# Patient Record
Sex: MALE | Race: WHITE
[De-identification: names, ages, dates, MRNs, and addresses within clinical notes are randomized per-mention and may not be internally consistent; named-entity substitution may affect disease eponyms.]

---

## 2018-08-03 ENCOUNTER — HOSPITAL ENCOUNTER (OUTPATIENT)
Dept: HOSPITAL 84 - D.CATH | Age: 53
Discharge: HOME | End: 2018-08-03
Attending: INTERNAL MEDICINE
Payer: COMMERCIAL

## 2018-08-03 VITALS — WEIGHT: 230.48 LBS | BODY MASS INDEX: 34.14 KG/M2 | HEIGHT: 69 IN

## 2018-08-03 VITALS — DIASTOLIC BLOOD PRESSURE: 86 MMHG | SYSTOLIC BLOOD PRESSURE: 158 MMHG

## 2018-08-03 DIAGNOSIS — R07.89: Primary | ICD-10-CM

## 2018-08-03 LAB
ANION GAP SERPL CALC-SCNC: 10 MMOL/L (ref 8–16)
BASOPHILS NFR BLD AUTO: 0.3 % (ref 0–2)
BUN SERPL-MCNC: 27 MG/DL (ref 7–18)
CALCIUM SERPL-MCNC: 9 MG/DL (ref 8.5–10.1)
CHLORIDE SERPL-SCNC: 100 MMOL/L (ref 98–107)
CO2 SERPL-SCNC: 31.6 MMOL/L (ref 21–32)
CREAT SERPL-MCNC: 1.5 MG/DL (ref 0.6–1.3)
EOSINOPHIL NFR BLD: 8.8 % (ref 0–7)
ERYTHROCYTE [DISTWIDTH] IN BLOOD BY AUTOMATED COUNT: 13.4 % (ref 11.5–14.5)
GLUCOSE SERPL-MCNC: 275 MG/DL (ref 74–106)
HCT VFR BLD CALC: 35.1 % (ref 42–54)
HGB BLD-MCNC: 11.5 G/DL (ref 13.5–17.5)
IMM GRANULOCYTES NFR BLD: 0.3 % (ref 0–5)
LYMPHOCYTES NFR BLD AUTO: 30.6 % (ref 15–50)
MCH RBC QN AUTO: 27.7 PG (ref 26–34)
MCHC RBC AUTO-ENTMCNC: 32.8 G/DL (ref 31–37)
MCV RBC: 84.6 FL (ref 80–100)
MONOCYTES NFR BLD: 12.2 % (ref 2–11)
NEUTROPHILS NFR BLD AUTO: 47.8 % (ref 40–80)
OSMOLALITY SERPL CALC.SUM OF ELEC: 288 MOSM/KG (ref 275–300)
PLATELET # BLD: 167 10X3/UL (ref 130–400)
PMV BLD AUTO: 10.3 FL (ref 7.4–10.4)
POTASSIUM SERPL-SCNC: 4.6 MMOL/L (ref 3.5–5.1)
RBC # BLD AUTO: 4.15 10X6/UL (ref 4.2–6.1)
SODIUM SERPL-SCNC: 137 MMOL/L (ref 136–145)
WBC # BLD AUTO: 5.9 10X3/UL (ref 4.8–10.8)

## 2018-11-19 ENCOUNTER — HOSPITAL ENCOUNTER (OUTPATIENT)
Dept: HOSPITAL 84 - D.LABREF | Age: 53
Discharge: HOME | End: 2018-11-19
Attending: ORTHOPAEDIC SURGERY
Payer: COMMERCIAL

## 2018-11-19 VITALS — BODY MASS INDEX: 34 KG/M2

## 2018-11-19 DIAGNOSIS — L08.9: Primary | ICD-10-CM

## 2018-11-26 ENCOUNTER — HOSPITAL ENCOUNTER (OUTPATIENT)
Dept: HOSPITAL 84 - D.MS | Age: 53
Setting detail: OBSERVATION
LOS: 3 days | Discharge: HOME | End: 2018-11-29
Attending: ORTHOPAEDIC SURGERY | Admitting: ORTHOPAEDIC SURGERY
Payer: COMMERCIAL

## 2018-11-26 VITALS — SYSTOLIC BLOOD PRESSURE: 111 MMHG | DIASTOLIC BLOOD PRESSURE: 57 MMHG

## 2018-11-26 VITALS
WEIGHT: 230 LBS | BODY MASS INDEX: 34.07 KG/M2 | BODY MASS INDEX: 34.07 KG/M2 | WEIGHT: 230 LBS | BODY MASS INDEX: 34.07 KG/M2 | HEIGHT: 69 IN | BODY MASS INDEX: 34.07 KG/M2 | HEIGHT: 69 IN

## 2018-11-26 VITALS — SYSTOLIC BLOOD PRESSURE: 145 MMHG | DIASTOLIC BLOOD PRESSURE: 86 MMHG

## 2018-11-26 VITALS — DIASTOLIC BLOOD PRESSURE: 84 MMHG | SYSTOLIC BLOOD PRESSURE: 164 MMHG

## 2018-11-26 DIAGNOSIS — E11.628: Primary | ICD-10-CM

## 2018-11-26 DIAGNOSIS — L03.039: ICD-10-CM

## 2018-11-27 VITALS — DIASTOLIC BLOOD PRESSURE: 99 MMHG | SYSTOLIC BLOOD PRESSURE: 169 MMHG

## 2018-11-27 VITALS — SYSTOLIC BLOOD PRESSURE: 159 MMHG | DIASTOLIC BLOOD PRESSURE: 93 MMHG

## 2018-11-27 VITALS — DIASTOLIC BLOOD PRESSURE: 68 MMHG | SYSTOLIC BLOOD PRESSURE: 127 MMHG

## 2018-11-27 VITALS — DIASTOLIC BLOOD PRESSURE: 64 MMHG | SYSTOLIC BLOOD PRESSURE: 124 MMHG

## 2018-11-27 VITALS — SYSTOLIC BLOOD PRESSURE: 145 MMHG | DIASTOLIC BLOOD PRESSURE: 85 MMHG

## 2018-11-27 VITALS — SYSTOLIC BLOOD PRESSURE: 138 MMHG | DIASTOLIC BLOOD PRESSURE: 84 MMHG

## 2018-11-28 VITALS — SYSTOLIC BLOOD PRESSURE: 127 MMHG | DIASTOLIC BLOOD PRESSURE: 75 MMHG

## 2018-11-28 VITALS — DIASTOLIC BLOOD PRESSURE: 84 MMHG | SYSTOLIC BLOOD PRESSURE: 133 MMHG

## 2018-11-28 VITALS — SYSTOLIC BLOOD PRESSURE: 137 MMHG | DIASTOLIC BLOOD PRESSURE: 85 MMHG

## 2018-11-28 VITALS — DIASTOLIC BLOOD PRESSURE: 88 MMHG | SYSTOLIC BLOOD PRESSURE: 139 MMHG

## 2018-11-28 LAB
ANION GAP SERPL CALC-SCNC: 8.6 MMOL/L (ref 8–16)
BASOPHILS NFR BLD AUTO: 0.3 % (ref 0–2)
BUN SERPL-MCNC: 26 MG/DL (ref 7–18)
CALCIUM SERPL-MCNC: 9.4 MG/DL (ref 8.5–10.1)
CHLORIDE SERPL-SCNC: 99 MMOL/L (ref 98–107)
CO2 SERPL-SCNC: 31.6 MMOL/L (ref 21–32)
CREAT SERPL-MCNC: 1.1 MG/DL (ref 0.6–1.3)
CRP SERPL-MCNC: 0.7 MG/DL (ref 0–0.9)
EOSINOPHIL NFR BLD: 7 % (ref 0–7)
ERYTHROCYTE [DISTWIDTH] IN BLOOD BY AUTOMATED COUNT: 13.4 % (ref 11.5–14.5)
ERYTHROCYTE [SEDIMENTATION RATE] IN BLOOD: 18 MM/HR (ref 0–20)
GLUCOSE SERPL-MCNC: 223 MG/DL (ref 74–106)
HCT VFR BLD CALC: 35.8 % (ref 42–54)
HGB BLD-MCNC: 11.7 G/DL (ref 13.5–17.5)
IMM GRANULOCYTES NFR BLD: 0.1 % (ref 0–5)
LYMPHOCYTES NFR BLD AUTO: 25 % (ref 15–50)
MCH RBC QN AUTO: 27.5 PG (ref 26–34)
MCHC RBC AUTO-ENTMCNC: 32.7 G/DL (ref 31–37)
MCV RBC: 84 FL (ref 80–100)
MONOCYTES NFR BLD: 7.9 % (ref 2–11)
NEUTROPHILS NFR BLD AUTO: 59.7 % (ref 40–80)
OSMOLALITY SERPL CALC.SUM OF ELEC: 281 MOSM/KG (ref 275–300)
PLATELET # BLD: 205 10X3/UL (ref 130–400)
PMV BLD AUTO: 10.6 FL (ref 7.4–10.4)
POTASSIUM SERPL-SCNC: 4.2 MMOL/L (ref 3.5–5.1)
RBC # BLD AUTO: 4.26 10X6/UL (ref 4.2–6.1)
SODIUM SERPL-SCNC: 135 MMOL/L (ref 136–145)
WBC # BLD AUTO: 6.7 10X3/UL (ref 4.8–10.8)

## 2018-11-29 VITALS — SYSTOLIC BLOOD PRESSURE: 129 MMHG | DIASTOLIC BLOOD PRESSURE: 79 MMHG

## 2018-11-29 VITALS — DIASTOLIC BLOOD PRESSURE: 93 MMHG | SYSTOLIC BLOOD PRESSURE: 148 MMHG

## 2020-01-20 ENCOUNTER — HOSPITAL ENCOUNTER (OUTPATIENT)
Dept: HOSPITAL 84 - D.ER | Age: 55
Setting detail: OBSERVATION
LOS: 1 days | Discharge: HOME | End: 2020-01-21
Attending: FAMILY MEDICINE | Admitting: FAMILY MEDICINE
Payer: COMMERCIAL

## 2020-01-20 VITALS — WEIGHT: 225.47 LBS | HEIGHT: 60 IN | BODY MASS INDEX: 44.27 KG/M2

## 2020-01-20 DIAGNOSIS — D64.9: ICD-10-CM

## 2020-01-20 DIAGNOSIS — S82.892A: ICD-10-CM
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
## 2020-01-20 DIAGNOSIS — E11.9: ICD-10-CM

## 2020-01-20 DIAGNOSIS — I10: ICD-10-CM

## 2020-01-20 DIAGNOSIS — R07.9: Primary | ICD-10-CM

## 2020-01-20 DIAGNOSIS — Z85.038: ICD-10-CM

## 2020-01-20 LAB
ALBUMIN SERPL-MCNC: 3.5 G/DL (ref 3.4–5)
ALP SERPL-CCNC: 114 U/L (ref 46–116)
ALT SERPL-CCNC: 31 U/L (ref 10–68)
ANION GAP SERPL CALC-SCNC: 10.6 MMOL/L (ref 8–16)
APPEARANCE UR: CLEAR
BASOPHILS NFR BLD AUTO: 0.3 % (ref 0–2)
BILIRUB SERPL-MCNC: 0.33 MG/DL (ref 0.2–1.3)
BILIRUB SERPL-MCNC: NEGATIVE MG/DL
BUN SERPL-MCNC: 19 MG/DL (ref 7–18)
CALCIUM SERPL-MCNC: 9.1 MG/DL (ref 8.5–10.1)
CHLORIDE SERPL-SCNC: 100 MMOL/L (ref 98–107)
CK MB SERPL-MCNC: 1.3 U/L (ref 0–3.6)
CK SERPL-CCNC: 94 UL (ref 21–232)
CO2 SERPL-SCNC: 31 MMOL/L (ref 21–32)
COLOR UR: YELLOW
CREAT SERPL-MCNC: 1.2 MG/DL (ref 0.6–1.3)
EOSINOPHIL NFR BLD: 4.8 % (ref 0–7)
ERYTHROCYTE [DISTWIDTH] IN BLOOD BY AUTOMATED COUNT: 13.4 % (ref 11.5–14.5)
GLOBULIN SER-MCNC: 3.6 G/L
GLUCOSE SERPL-MCNC: 1000 MG/DL
GLUCOSE SERPL-MCNC: 408 MG/DL (ref 74–106)
HCT VFR BLD CALC: 38.7 % (ref 42–54)
HGB BLD-MCNC: 12.4 G/DL (ref 13.5–17.5)
IMM GRANULOCYTES NFR BLD: 0.1 % (ref 0–5)
INR PPP: 0.88 (ref 0.85–1.17)
KETONES UR STRIP-MCNC: NEGATIVE MG/DL
LYMPHOCYTES NFR BLD AUTO: 24.6 % (ref 15–50)
MCH RBC QN AUTO: 27.7 PG (ref 26–34)
MCHC RBC AUTO-ENTMCNC: 32 G/DL (ref 31–37)
MCV RBC: 86.6 FL (ref 80–100)
MONOCYTES NFR BLD: 6.3 % (ref 2–11)
NEUTROPHILS NFR BLD AUTO: 63.9 % (ref 40–80)
NITRITE UR-MCNC: NEGATIVE MG/ML
OSMOLALITY SERPL CALC.SUM OF ELEC: 292 MOSM/KG (ref 275–300)
PH UR STRIP: 6 [PH] (ref 5–6)
PLATELET # BLD: 183 10X3/UL (ref 130–400)
PMV BLD AUTO: 10.5 FL (ref 7.4–10.4)
POTASSIUM SERPL-SCNC: 4.6 MMOL/L (ref 3.5–5.1)
PROT SERPL-MCNC: 7.1 G/DL (ref 6.4–8.2)
PROT UR-MCNC: NEGATIVE MG/DL
PROTHROMBIN TIME: 11.9 SECONDS (ref 11.6–15)
RBC # BLD AUTO: 4.47 10X6/UL (ref 4.2–6.1)
SODIUM SERPL-SCNC: 137 MMOL/L (ref 136–145)
SP GR UR STRIP: 1.01 (ref 1–1.02)
TROPONIN I SERPL-MCNC: < 0.017 NG/ML (ref 0–0.06)
UROBILINOGEN UR-MCNC: NORMAL MG/DL
WBC # BLD AUTO: 7 10X3/UL (ref 4.8–10.8)

## 2020-01-20 NOTE — NUR
PATIENT ARRIVED FROM THE ER. PATIENT IS ALERT AND ORIENTED, RESTING
COMFORTABLY IN BED. RESPIRATIONS ARE EVEN AND UNLABORED. NO S/S OF DISTRESS.
NO C/O PAIN. PATIENT REFUSED X RAY OF LEFT LEG. PATIENT AND SPOUSE STATED THAT
HE IS HAVING SURGERY IN [place]. X RAY HAVE ALREADY BEEN COMPLETED AT
ANOTHER FACILITY. PATIENT EATING [employer]. CALL LIGHT WITHIN REACH. WILL
CPOC.

## 2020-01-20 NOTE — CN
PATIENT NAME:OPAL WILLAMS                              MEDICAL RECORD: [id number]
: [birth year]                                              LOCATION:D. D.2
ADMIT DATE: 20                                       ACCOUNT: [id number]
CONSULTING PHYSICIAN:    IMAN LANGFORD MD             
                                               
REFERRING PHYSICIAN:     TRUE LOVE DO            
 
 
DATE OF CONSULTATION:  2020
 
CARDIOLOGY CONSULTATION
 
ADMITTING DIAGNOSES:
1.  Chest pain.
2.  Hypertension.
3.  Noninsulin-dependent diabetes.
 
HISTORY OF PRESENT ILLNESS:  Mr. Willams presents with chest pain, some atypical
components, some typical components.  He had 3 hours of a dull aching heavy
sensation yesterday evening, then he had a sharp pain, it did not last very
long.  The sharp pain was more intense and what made him actually come in to the
hospital.  Troponin is normal.  EKG is with no acute changes.  He had a cardiac
catheterization in 2018 with absolutely clean coronaries with no coronary
artery disease.
 
PHYSICAL EXAMINATION:
CONSTITUTIONAL/GENERAL APPEARANCE:  Well nourished, well developed, appears
stated age.
EYES:  Lids and conjunctivae noninjected.  No discharge.  No pallor.
ENT:  Lips within normal limit.  No cyanosis.  No pallor.
NECK:  Carotid arteries, bilateral normal upstroke.  No bruits.  No thrills.  No
jugular venous pressure or distention.
CERVICAL LYMPH NODES:  Nontender.  Nonenlarged.
THYROID:  Not enlarged.  No nodules.
CARDIOVASCULAR:  Precordial exam, nondisplaced.  No heaves or pericardial
thrills.  Rate and rhythm, regular.  Heart sounds, normal S1, normal S2.  No S3,
no gallop, no rub.  Systolic murmur, not heard.  Diastolic murmur, not heard.
RESPIRATORY:  Respiratory effort, unlabored.  Normal curvature.  No thoracic
deformity.  No chest wall tenderness.  Percussion, resonant.  Auscultation,
clear.  No wheezes, no rales, no rhonchi.
ABDOMEN:  Soft, nondistended, nontender.  No abdominal pain, no vomiting and
normal appetite.
MUSCULOSKELETAL:  No joint tenderness, normal gait, normal tone.
SKIN:  Warm and dry.
 
OVERALL IMPRESSION:  Chest pain.  Cardiac catheterization a year and a half ago
with absolutely clean vessels, likelihood of hemodynamically significant
coronary artery disease at this time is very low.  He has a normal troponin,
normal EKG.  His blood pressure was quite elevated upon arrival.  We will treat
him with Norvasc 5 mg a day.  We will follow up in 2-3 weeks.  If he continues
to have chest pain, we will risk stratify with stress testing.
 
TRANSINT:[id number] Voice Confirmation ID: [id number] DOCUMENT ID: [id number]
                                           
 
 
 
CONSULT REPORT                                 [id number]    OPAL WILLAMS JEFFREY MD             
 
 
 
 
 
 
 
 
 
 
 
 
 
 
 
 
 
 
 
 
 
 
 
 
 
 
 
 
 
 
 
 
 
 
 
 
 
 
 
 
 
 
 
 
 
 
CC:                                                             [id number]
DICTATION DATE: 20     :     20 07      ADM IN  
                                                                              
[place] [id number] [place] [id number]

## 2020-01-20 NOTE — NUR
PT REPORTS HE TOOK DILAUDID THAT WAS NOT PRESCRIBED FOR HIM PRIOR TO COMING TO
THE ED.  PT WAS CAUTIONED NOT TO TAKE MEDS THAT ARE NOT PRESCRIBED FOR HIM.  HE
STATED, "I KNOW I'M NOT SUPPOSED TO DO THAT."

## 2020-01-20 NOTE — NUR
PT REFUSED XRAY OF ANKLE BECAUSE HE HAD IT XRAYED LAST THURSDAY & IS SCHEDULED
FOR SURGERY NEXT WEEK IN [place]

## 2020-01-21 VITALS — DIASTOLIC BLOOD PRESSURE: 81 MMHG | SYSTOLIC BLOOD PRESSURE: 125 MMHG

## 2020-01-21 VITALS — SYSTOLIC BLOOD PRESSURE: 157 MMHG | DIASTOLIC BLOOD PRESSURE: 76 MMHG

## 2020-01-21 LAB
ANION GAP SERPL CALC-SCNC: 8.2 MMOL/L (ref 8–16)
APTT BLD: 26.3 SECONDS (ref 22.8–39.4)
BASOPHILS NFR BLD AUTO: 0.6 % (ref 0–2)
BUN SERPL-MCNC: 14 MG/DL (ref 7–18)
CALCIUM SERPL-MCNC: 8.3 MG/DL (ref 8.5–10.1)
CHLORIDE SERPL-SCNC: 106 MMOL/L (ref 98–107)
CK MB SERPL-MCNC: 0.9 U/L (ref 0–3.6)
CK MB SERPL-MCNC: 1 U/L (ref 0–3.6)
CK SERPL-CCNC: 65 UL (ref 21–232)
CK SERPL-CCNC: 74 UL (ref 21–232)
CO2 SERPL-SCNC: 31.2 MMOL/L (ref 21–32)
CREAT SERPL-MCNC: 1.1 MG/DL (ref 0.6–1.3)
EOSINOPHIL NFR BLD: 8 % (ref 0–7)
ERYTHROCYTE [DISTWIDTH] IN BLOOD BY AUTOMATED COUNT: 13.5 % (ref 11.5–14.5)
GLUCOSE SERPL-MCNC: 242 MG/DL (ref 74–106)
HCT VFR BLD CALC: 35 % (ref 42–54)
HGB BLD-MCNC: 10.9 G/DL (ref 13.5–17.5)
IMM GRANULOCYTES NFR BLD: 0.1 % (ref 0–5)
INR PPP: 0.98 (ref 0.85–1.17)
LYMPHOCYTES NFR BLD AUTO: 31.9 % (ref 15–50)
MAGNESIUM SERPL-MCNC: 1.8 MG/DL (ref 1.8–2.4)
MCH RBC QN AUTO: 27 PG (ref 26–34)
MCHC RBC AUTO-ENTMCNC: 31.1 G/DL (ref 31–37)
MCV RBC: 86.8 FL (ref 80–100)
MONOCYTES NFR BLD: 6.7 % (ref 2–11)
NEUTROPHILS NFR BLD AUTO: 52.7 % (ref 40–80)
NT-PROBNP SERPL-MCNC: 611 PG/ML (ref 0–125)
OSMOLALITY SERPL CALC.SUM OF ELEC: 289 MOSM/KG (ref 275–300)
PHOSPHATE SERPL-MCNC: 3.3 MG/DL (ref 2.5–4.9)
PLATELET # BLD: 173 10X3/UL (ref 130–400)
PMV BLD AUTO: 10.6 FL (ref 7.4–10.4)
POTASSIUM SERPL-SCNC: 4.4 MMOL/L (ref 3.5–5.1)
PROTHROMBIN TIME: 12.9 SECONDS (ref 11.6–15)
RBC # BLD AUTO: 4.03 10X6/UL (ref 4.2–6.1)
SODIUM SERPL-SCNC: 141 MMOL/L (ref 136–145)
TROPONIN I SERPL-MCNC: 0.02 NG/ML (ref 0–0.06)
TROPONIN I SERPL-MCNC: 0.02 NG/ML (ref 0–0.06)
WBC # BLD AUTO: 6.7 10X3/UL (ref 4.8–10.8)

## 2020-01-22 NOTE — MORECARE
CASE MANAGEMENT DISCHARGE SUMMARY
 
 
PATIENT: OPAL BELTRAN                    UNIT: [id number]
ACCOUNT#: [id number]                       ADM DATE: 20
AGE: 54     : [birth year]  SEX: M            ROOM/BED: D.2122    
AUTHOR: DNEZEL THORPE                             PHYSICIAN:                               
 
REFERRING PHYSICIAN: TRUE LOVE DO            
DATE OF SERVICE: 20
Discharge Plan
 
 
Patient Name: OPAL BELTRAN
[facility]
Encounter #: [id number]
Medical Record #: [id number]
: 1965
Planned Disposition: Home
Anticipated Discharge Date: 20
 
Discharge Date: 2020
Expected LOS: 1
Initial Reviewer: [email or web address]
Initial Review Date: 2020
Generated: 20   9:20 am 
  
 
 
 
 
 
 
 
Patient Name: OPAL BELTRAN
 
Encounter #: [id number]
Page [id number]
 
 
 
 
 
Electronically Signed by DENZEL THORPE on 20 at 0820
 
 
 
 
 
 
**All edits/amendments must be made on the electronic document**
 
DICTATION DATE: 20     : ANGEL  20     
RPT#: [id number]                                DC DATE:20
                                               STATUS: DIS IN  
[facility]
[address] [place] [address]
***END OF REPORT***

## 2020-06-26 ENCOUNTER — HOSPITAL ENCOUNTER (OUTPATIENT)
Dept: HOSPITAL 84 - D.LABREF | Age: 55
Discharge: HOME | End: 2020-06-26
Attending: FAMILY MEDICINE
Payer: COMMERCIAL

## 2020-06-26 DIAGNOSIS — E11.621: Primary | ICD-10-CM
